# Patient Record
Sex: MALE | Race: WHITE
[De-identification: names, ages, dates, MRNs, and addresses within clinical notes are randomized per-mention and may not be internally consistent; named-entity substitution may affect disease eponyms.]

---

## 2020-02-28 ENCOUNTER — HOSPITAL ENCOUNTER (EMERGENCY)
Dept: HOSPITAL 54 - ER | Age: 30
Discharge: LEFT BEFORE DISCHARGE | End: 2020-02-28
Payer: SELF-PAY

## 2020-02-28 DIAGNOSIS — J11.1: Primary | ICD-10-CM

## 2020-02-28 DIAGNOSIS — Z53.21: ICD-10-CM

## 2021-06-09 ENCOUNTER — HOSPITAL ENCOUNTER (EMERGENCY)
Dept: HOSPITAL 54 - ER | Age: 31
Discharge: HOME | End: 2021-06-09
Payer: SELF-PAY

## 2021-06-09 VITALS — HEIGHT: 74 IN | WEIGHT: 230 LBS | BODY MASS INDEX: 29.52 KG/M2

## 2021-06-09 VITALS — DIASTOLIC BLOOD PRESSURE: 69 MMHG | SYSTOLIC BLOOD PRESSURE: 123 MMHG
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
## 2021-06-09 DIAGNOSIS — R51.9: ICD-10-CM
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
## 2021-06-09 DIAGNOSIS — S06.0X0A: Primary | ICD-10-CM

## 2021-06-09 DIAGNOSIS — S00.01XA: ICD-10-CM
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
## 2021-06-09 DIAGNOSIS — R41.3: ICD-10-CM
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
